# Patient Record
Sex: MALE | URBAN - METROPOLITAN AREA
[De-identification: names, ages, dates, MRNs, and addresses within clinical notes are randomized per-mention and may not be internally consistent; named-entity substitution may affect disease eponyms.]

---

## 2021-02-10 ENCOUNTER — OFFICE VISIT (OUTPATIENT)
Dept: URBAN - METROPOLITAN AREA CLINIC 98 | Facility: CLINIC | Age: 56
End: 2021-02-10

## 2021-02-17 ENCOUNTER — OFFICE VISIT (OUTPATIENT)
Dept: URBAN - METROPOLITAN AREA CLINIC 126 | Facility: CLINIC | Age: 56
End: 2021-02-17

## 2021-02-18 ENCOUNTER — OFFICE VISIT (OUTPATIENT)
Dept: URBAN - METROPOLITAN AREA CLINIC 98 | Facility: CLINIC | Age: 56
End: 2021-02-18
Payer: COMMERCIAL

## 2021-02-18 VITALS
DIASTOLIC BLOOD PRESSURE: 79 MMHG | SYSTOLIC BLOOD PRESSURE: 159 MMHG | HEIGHT: 69 IN | WEIGHT: 204.6 LBS | BODY MASS INDEX: 30.3 KG/M2 | TEMPERATURE: 97.3 F | HEART RATE: 67 BPM

## 2021-02-18 DIAGNOSIS — K21.9 GERD: ICD-10-CM

## 2021-02-18 DIAGNOSIS — Z12.11 SCREENING COLONOSCOPY: ICD-10-CM

## 2021-02-18 PROBLEM — 21522001 ABDOMINAL PAIN: Status: ACTIVE | Noted: 2021-02-18

## 2021-02-18 PROBLEM — 235595009 GASTROESOPHAGEAL REFLUX DISEASE: Status: ACTIVE | Noted: 2021-02-18

## 2021-02-18 PROCEDURE — G8427 DOCREV CUR MEDS BY ELIG CLIN: HCPCS | Performed by: INTERNAL MEDICINE

## 2021-02-18 PROCEDURE — G8420 CALC BMI NORM PARAMETERS: HCPCS | Performed by: INTERNAL MEDICINE

## 2021-02-18 PROCEDURE — G8483 FLU IMM NO ADMIN DOC REA: HCPCS | Performed by: INTERNAL MEDICINE

## 2021-02-18 PROCEDURE — 99204 OFFICE O/P NEW MOD 45 MIN: CPT | Performed by: INTERNAL MEDICINE

## 2021-02-18 PROCEDURE — 1036F TOBACCO NON-USER: CPT | Performed by: INTERNAL MEDICINE

## 2021-02-18 PROCEDURE — 3017F COLORECTAL CA SCREEN DOC REV: CPT | Performed by: INTERNAL MEDICINE

## 2021-02-18 RX ORDER — SODIUM, POTASSIUM,MAG SULFATES 17.5-3.13G
AS DIRECTED SOLUTION, RECONSTITUTED, ORAL ORAL
OUTPATIENT
Start: 2021-02-18

## 2021-02-18 RX ORDER — OMEPRAZOLE 20 MG/1
1 CAPSULE 30 MINUTES BEFORE MORNING MEAL CAPSULE, DELAYED RELEASE ORAL TWICE A DAY
Qty: 28 | Refills: 1 | OUTPATIENT
Start: 2021-02-18

## 2021-02-18 RX ORDER — PANTOPRAZOLE SODIUM 20 MG/1
1 TABLET TABLET, DELAYED RELEASE ORAL ONCE A DAY
Status: ACTIVE | COMMUNITY

## 2021-02-18 NOTE — HPI-OTHER HISTORIES
Mr. Jesus is a 55 yo M with h/o GERD. He has been on pantoprazole 20 mg daily and has been on this for a long time. He has acid regurgitation mostly at night. It is worse when he lays down. His symptoms are worse with tomato-based foods and cheese.  No dysphagia or weight loss. He has no nausea or vomiting. He has no constipation or blood in the stool. No change in bowel habits.   He had an EGD 2 months ago and was having nausea, vomiting and abdominal pain. He had an esophageal ulcer at that time. He had a RUQ U/S and was found to have gallstones. He had a cholecystectomy on 2/9/21. He has been taking sucralfate which helped the first day. He felt he was having trouble swallowing the sucralfate. He went to [facility] ED on 2/8/21 and was found to have abnormalities on his MRCP requiring cholecystectomy.  He had a cholecystectomy on 2/9/21. The abdominal pain is much better since his cholecystectomy.  I personally reviewed his CBC from [facility] 2/11/21: 10.4/30.4 MCV 87  He has never had a colonoscopy

## 2021-02-18 NOTE — EXAM-AA
Constitutional: well-developed, normal communication ability.   Eyes: Conjunctivae and eyelids appear normal, no scleral icterus. Respiratory: lungs clear to auscultation bilaterally, no rales or wheezing, no crackles   Cardiovascular: No edema, no murmurs or gallops appreciated.   Gastrointestinal:  normoactive bowel sounds, soft, incision sites without erythema, mild tenderness, no rebound tenderness, no shifting dullness, no organomegaly.   Musculoskeletal: normal gait and station   Skin: no jaundice   Neurologic: Oriented to person, place, time. Short term memory intact.    Psychiatric: Normal mood and appropriate affect.

## 2021-02-19 ENCOUNTER — DASHBOARD ENCOUNTERS (OUTPATIENT)
Age: 56
End: 2021-02-19

## 2021-02-23 ENCOUNTER — TELEPHONE ENCOUNTER (OUTPATIENT)
Dept: URBAN - METROPOLITAN AREA CLINIC 98 | Facility: CLINIC | Age: 56
End: 2021-02-23

## 2021-03-10 ENCOUNTER — OFFICE VISIT (OUTPATIENT)
Dept: URBAN - METROPOLITAN AREA CLINIC 98 | Facility: CLINIC | Age: 56
End: 2021-03-10